# Patient Record
Sex: FEMALE | Race: WHITE | URBAN - METROPOLITAN AREA
[De-identification: names, ages, dates, MRNs, and addresses within clinical notes are randomized per-mention and may not be internally consistent; named-entity substitution may affect disease eponyms.]

---

## 2022-05-31 DIAGNOSIS — E03.9 PRIMARY HYPOTHYROIDISM: Primary | ICD-10-CM

## 2022-10-20 ENCOUNTER — PATIENT MESSAGE (OUTPATIENT)
Dept: ENDOCRINOLOGY | Age: 42
End: 2022-10-20

## 2022-10-20 RX ORDER — LEVOTHYROXINE SODIUM 125 MCG
125 TABLET ORAL
Qty: 90 TABLET | Refills: 3 | Status: SHIPPED | OUTPATIENT
Start: 2022-10-20 | End: 2022-11-01 | Stop reason: SDUPTHER

## 2022-10-20 NOTE — TELEPHONE ENCOUNTER
From: Myrtle Jameson  To: Dr. Sathya Mireles  Sent: 10/20/2022 2:32 PM EDT  Subject: Prescription Refill    I have an appointment scheduled for 11/1/22 with Dr. Carmelo Alex. I have tried refilling my prescription with [facility] (sent refill request to them on 10/10/22). I noticed I had not gotten a response yet (and I only have 7 pills left) so I called. They said that they cannot refill it without approval from Dr. Carmelo Alex and had tried reaching out to him. Can this approval be sent in so I do not have a lapse in my prescription before my next appointment? Thanks!

## 2022-11-01 ENCOUNTER — OFFICE VISIT (OUTPATIENT)
Dept: ENDOCRINOLOGY | Age: 42
End: 2022-11-01
Payer: COMMERCIAL

## 2022-11-01 VITALS — DIASTOLIC BLOOD PRESSURE: 82 MMHG | WEIGHT: 183 LBS | BODY MASS INDEX: 30.45 KG/M2 | SYSTOLIC BLOOD PRESSURE: 120 MMHG

## 2022-11-01 DIAGNOSIS — E03.9 PRIMARY HYPOTHYROIDISM: Primary | ICD-10-CM

## 2022-11-01 PROCEDURE — 99213 OFFICE O/P EST LOW 20 MIN: CPT | Performed by: INTERNAL MEDICINE

## 2022-11-01 RX ORDER — LEVOTHYROXINE SODIUM 125 MCG
125 TABLET ORAL
Qty: 90 TABLET | Refills: 3
Start: 2022-11-01

## 2022-11-01 NOTE — PROGRESS NOTES
Randi Browne MD, [address] [facility]            Reason for visit: follow-up of hypothyroidism      ASSESSMENT AND PLAN:    1. Primary hypothyroidism  She has been clinically and biochemically euthyroid. No changes. Return in 1 year with labs. - SYNTHROID 125 MCG tablet; Take 1 tablet by mouth every morning (before breakfast)  Dispense: 90 tablet; Refill: 3  - TSH; Future  - T4, Free; Future      Follow-up and Dispositions    Return in about 1 year (around 11/1/2023). History of Present Illness:    Rashaun Castillo presents to the office for follow up of primary hypothyroidism. This was previously treated with T4/T3 combination therapy but is now treated with Synthroid alone. Presentation of hypothyroidism: Diagnosed with hypothyroidism in 2002. Treatment status: She had been taking Synthroid 88 mcg daily beginning early-mid 2010.  Her regimen has been adjusted as follows:  [id number]- increased to 100 mcg daily  5/6/2016- decreased to 88 mcg daily, and liothyronine 5 mcg daily was added  7/20/2016- increased to 100 mcg daily and liothyronine was discontinued  11/23/2016- increased to 112 mcg daily  8/10/2018- increased to 125 mcg daily  8/15/2019- decreased to 125 mcg daily 6 days/week and 62.5 mcg daily 1 day/week  11/12/2019- decreased to 125 mcg daily 6 days/week  8/24/2020- increased to 125 mcg daily 6 days/week and 62.5 mcg daily 1 day/week  6/24/2021- increased to 125 mcg daily    Symptoms:  See review of systems below    Labs:  2/14/2011: TSH 1.086, free T4 1.0.   10/3/2011: TSH 2.220, T4 17.7.   11/15/2011: TSH 1.480, T4 17.1.   1/16/2012: TSH 1.740, free T4 1.06.   6/21/2012: TSH 0.238, free T4 1.13.   9/19/2012: TSH 0.496, free T4 1.31.  6/13/2013: TSH 1.600.  12/24/2013: TSH 3.660.  2/17/2014: TSH 0.716.  6/24/2014: TSH 0.503.  4/21/2015: TSH 0.992, free T4 1.27.   5/2/2016: TSH 1.180, free T4 1.20.  7/8/2016:  TSH 1.100, free T4 1.20, T3 3.18.  11/17/2016:  TSH 1.420, free T4 1.45.  1/2/2017: TSH 1.190, free T4 1.33   3/28/2017: TSH 0.997, free T4 1.31.  7/20/2017: TSH 1.850, free T4 1. 16.  9/25/2017: TSH 1.630, free T4 1.03.  1/13/2018: TSH 0.219, free T4 1.51.  8/6/2018: TSH 3.290, free T4 1.36.  10/11/2018: TSH 0.305, free T4 1.56.  1/14/2019: TSH 2.370, free T4 1.34.  3/11/2019: TSH 0.913, free T4 1.29.  4/5/2019: TSH 1.970, free T4 1.09.  6/4/2019: TSH 1.470, free T4 1. 13.  8/14/2019: TSH 0.162, free T4 1.89.  11/4/2019: TSH 0.022, free T4 1.52.  1/2/2020: TSH 0.906, free T4 1.27.  8/20/2020: TSH 3.090, free T4 1.44.  11/27/2020: TSH 1.670, free T4 1.37.  6/9/2021: TSH 2.580, free T4 1.26.  9/8/2021: TSH 1.410, free T4 1. 19.  12/6/2021: TSH 0.176, free T4 1.45.  10/28/2022: TSH 1.060, free T4 1.54. Pregnancy: She is [id number]. She most recently delivered 6/27/2019. She is not breastfeeding. Review of Systems   Constitutional:  Negative for fatigue and unexpected weight change. Cardiovascular:  Negative for palpitations. /82 (Site: Left Upper Arm, Position: Sitting)   Wt 183 lb (83 kg)   BMI 30.45 kg/m²   Wt Readings from Last 3 Encounters:   11/01/22 183 lb (83 kg)   12/08/21 182 lb (82.6 kg)       Physical Exam  HENT:      Head: Normocephalic. Neck:      Thyroid: No thyroid mass or thyromegaly. Cardiovascular:      Rate and Rhythm: Normal rate. Pulmonary:      Effort: No respiratory distress. Breath sounds: Normal breath sounds. Neurological:      Mental Status: She is alert.    Psychiatric:         Mood and Affect: Mood normal.         Behavior: Behavior normal.       Orders Placed This Encounter   Procedures    TSH     Standing Status:   Future     Standing Expiration Date:   11/1/2024    T4, Free     Standing Status:   Future     Standing Expiration Date:   11/1/2024         Current Outpatient Medications   Medication Sig Dispense Refill    SYNTHROID 125 MCG tablet Take 1 tablet by mouth every morning (before breakfast) 90 tablet 3     No current facility-administered medications for this visit. Maria Antonia Frausto MD, FACE      Portions of this note were generated with the assistance of voice recognition software. As such, some errors in transcription may be present.

## 2023-09-18 DIAGNOSIS — E03.9 PRIMARY HYPOTHYROIDISM: ICD-10-CM

## 2023-09-18 RX ORDER — LEVOTHYROXINE SODIUM 125 MCG
125 TABLET ORAL
Qty: 90 TABLET | Refills: 3 | Status: SHIPPED | OUTPATIENT
Start: 2023-09-18

## 2023-11-01 ENCOUNTER — OFFICE VISIT (OUTPATIENT)
Dept: ENDOCRINOLOGY | Age: 43
End: 2023-11-01
Payer: COMMERCIAL

## 2023-11-01 VITALS
WEIGHT: 193 LBS | DIASTOLIC BLOOD PRESSURE: 82 MMHG | OXYGEN SATURATION: 91 % | HEART RATE: 87 BPM | SYSTOLIC BLOOD PRESSURE: 116 MMHG | BODY MASS INDEX: 32.12 KG/M2

## 2023-11-01 DIAGNOSIS — E03.9 PRIMARY HYPOTHYROIDISM: Primary | ICD-10-CM

## 2023-11-01 DIAGNOSIS — R63.5 WEIGHT GAIN: ICD-10-CM

## 2023-11-01 PROCEDURE — 99214 OFFICE O/P EST MOD 30 MIN: CPT | Performed by: INTERNAL MEDICINE

## 2023-11-01 RX ORDER — SEMAGLUTIDE 0.25 MG/.5ML
0.25 INJECTION, SOLUTION SUBCUTANEOUS
Qty: 2 ML | Refills: 0 | Status: SHIPPED | OUTPATIENT
Start: 2023-11-01

## 2023-11-01 RX ORDER — SEMAGLUTIDE 0.5 MG/.5ML
0.5 INJECTION, SOLUTION SUBCUTANEOUS
Qty: 2 ML | Refills: 11 | Status: SHIPPED | OUTPATIENT
Start: 2023-11-01

## 2023-11-01 RX ORDER — LEVOTHYROXINE SODIUM 125 MCG
125 TABLET ORAL
Qty: 90 TABLET | Refills: 3 | Status: SHIPPED | OUTPATIENT
Start: 2023-11-01

## 2024-06-10 ENCOUNTER — PATIENT MESSAGE (OUTPATIENT)
Dept: ENDOCRINOLOGY | Age: 44
End: 2024-06-10

## 2024-06-10 DIAGNOSIS — E03.9 PRIMARY HYPOTHYROIDISM: Primary | ICD-10-CM

## 2024-06-10 RX ORDER — LEVOTHYROXINE SODIUM 125 MCG
TABLET ORAL
Qty: 97 TABLET | Refills: 3 | Status: SHIPPED | OUTPATIENT
Start: 2024-06-10

## 2024-06-10 NOTE — TELEPHONE ENCOUNTER
My Chart message to patient:    Yes, I think that that is a good idea.  Go ahead and continue your current pill size but increase to taking one 1 tablet 6 days/week and 1-1/2 tablets 1 day/week (so, a total of 7-1/2 tablets/week).  I sent the prescription for the increased quantity to relocality.  I also sent you another lab order form on My Chart.  Please recheck your labs in about 2 months.  Thanks.

## 2024-06-10 NOTE — TELEPHONE ENCOUNTER
From: Myrtle Jameson  To: Dr. Anatoly Waters  Sent: 6/10/2024 11:16 AM EDT  Subject: TSH    Good morning! I participated in our health fair at work on 4/17/24. We just got lab work back and these were my thyroid results:   TSH- 2.720   T4- 9.0  T3 Uptake- 28  Free Thyroxine Index- 2.5  My Gyn started me on a compound cream of estrogen and progesterone and told me this could effect my Thyroid levels. I did not like some symptoms I was experiencing with the cream, and have a follow up visit scheduled at the end of July. I went off the cream over a month ago, but I can certainly tell that my thyroid is not in my optimal range of around 1.0 (weight gain, extremely tired, hair falling out more than normal). Should I increase the amount of Synthroid I'm taking, get labs done again, or what would you like for me to do? I am happy to scan these results in and send them to you.    Thank you!  Myrtle Jameson

## 2024-12-04 NOTE — PROGRESS NOTES
days/week  2020- increased to 125 mcg daily 6 days/week and 62.5 mcg daily 1 day/week  2021- increased to 125 mcg daily  2024-increase to 125 mcg daily 6 days/week and 187.5 mcg daily 1 day/week    Symptoms:  See review of systems below    Labs:  2011: TSH 1.086, free T4 1.0.   10/3/2011: TSH 2.220, T4 17.7.   11/15/2011: TSH 1.480, T4 17.1.   2012: TSH 1.740, free T4 1.06.   2012: TSH 0.238, free T4 1.13.   2012: TSH 0.496, free T4 1.31.  2013: TSH 1.600.  2013: TSH 3.660.  2014: TSH 0.716.  2014: TSH 0.503.  2015: TSH 0.992, free T4 1.27.   2016: TSH 1.180, free T4 1.20.  2016:  TSH 1.100, free T4 1.20, T3 3.18.  2016:  TSH 1.420, free T4 1.45.  2017: TSH 1.190, free T4 1.33   3/28/2017: TSH 0.997, free T4 1.31.  2017: TSH 1.850, free T4 1.16.  2017: TSH 1.630, free T4 1.03.  2018: TSH 0.219, free T4 1.51.  2018: TSH 3.290, free T4 1.36.  10/11/2018: TSH 0.305, free T4 1.56.  2019: TSH 2.370, free T4 1.34.  3/11/2019: TSH 0.913, free T4 1.29.  2019: TSH 1.970, free T4 1.09.  2019: TSH 1.470, free T4 1.13.  2019: TSH 0.162, free T4 1.89.  2019: TSH 0.022, free T4 1.52.  2020: TSH 0.906, free T4 1.27.  2020: TSH 3.090, free T4 1.44.  2020: TSH 1.670, free T4 1.37.  2021: TSH 2.580, free T4 1.26.  2021: TSH 1.410, free T4 1.19.  2021: TSH 0.176, free T4 1.45.  10/28/2022: TSH 1.060, free T4 1.54.  10/30/2023: TSH 0.703, free T4 1.45.  2023: TSH 0.806, free T4 1.60, estradiol less than 5.00, testosterone less than 5.0, FSH 43.4, cortisol 9.  2024: TSH 2.720, T4 9.0, free thyroxine index 2.5.  2024: TSH 0.23, free T4 1.5, T4 10.7, free T3 3.1, antithyroid peroxidase antibodies 435 (+)..  2024: TSH 0.357, free T4 1.68.    Pregnancy: She is A3.  She most recently delivered 2019.  She is not breastfeeding.      Review of Systems   Constitutional:

## 2024-12-05 ENCOUNTER — OFFICE VISIT (OUTPATIENT)
Dept: ENDOCRINOLOGY | Age: 44
End: 2024-12-05
Payer: COMMERCIAL

## 2024-12-05 VITALS — DIASTOLIC BLOOD PRESSURE: 80 MMHG | WEIGHT: 191 LBS | SYSTOLIC BLOOD PRESSURE: 122 MMHG | BODY MASS INDEX: 31.78 KG/M2

## 2024-12-05 DIAGNOSIS — E03.9 PRIMARY HYPOTHYROIDISM: Primary | ICD-10-CM

## 2024-12-05 DIAGNOSIS — E06.3 HASHIMOTO'S THYROIDITIS: ICD-10-CM

## 2024-12-05 PROCEDURE — 99213 OFFICE O/P EST LOW 20 MIN: CPT | Performed by: INTERNAL MEDICINE

## 2024-12-05 RX ORDER — LEVOTHYROXINE SODIUM 125 MCG
TABLET ORAL
Qty: 97 TABLET | Refills: 3 | Status: SHIPPED | OUTPATIENT
Start: 2024-12-05

## 2025-06-30 DIAGNOSIS — E03.9 PRIMARY HYPOTHYROIDISM: ICD-10-CM

## 2025-06-30 RX ORDER — LEVOTHYROXINE SODIUM 125 MCG
TABLET ORAL
Qty: 97 TABLET | Refills: 3 | Status: SHIPPED | OUTPATIENT
Start: 2025-06-30 | End: 2025-07-01 | Stop reason: SDUPTHER

## 2025-07-01 DIAGNOSIS — E03.9 PRIMARY HYPOTHYROIDISM: ICD-10-CM

## 2025-07-01 RX ORDER — LEVOTHYROXINE SODIUM 125 MCG
TABLET ORAL
Qty: 97 TABLET | Refills: 3 | Status: SHIPPED | OUTPATIENT
Start: 2025-07-01